# Patient Record
Sex: FEMALE | Race: ASIAN | NOT HISPANIC OR LATINO | URBAN - METROPOLITAN AREA
[De-identification: names, ages, dates, MRNs, and addresses within clinical notes are randomized per-mention and may not be internally consistent; named-entity substitution may affect disease eponyms.]

---

## 2018-05-11 ENCOUNTER — EMERGENCY (EMERGENCY)
Facility: HOSPITAL | Age: 33
LOS: 1 days | Discharge: HOME | End: 2018-05-11
Attending: EMERGENCY MEDICINE | Admitting: EMERGENCY MEDICINE
Payer: COMMERCIAL

## 2018-05-11 VITALS
TEMPERATURE: 98 F | OXYGEN SATURATION: 98 % | DIASTOLIC BLOOD PRESSURE: 72 MMHG | SYSTOLIC BLOOD PRESSURE: 126 MMHG | RESPIRATION RATE: 16 BRPM | HEART RATE: 86 BPM

## 2018-05-11 VITALS
WEIGHT: 175.05 LBS | RESPIRATION RATE: 16 BRPM | DIASTOLIC BLOOD PRESSURE: 94 MMHG | HEIGHT: 62 IN | SYSTOLIC BLOOD PRESSURE: 123 MMHG | HEART RATE: 92 BPM | OXYGEN SATURATION: 97 % | TEMPERATURE: 98 F

## 2018-05-11 PROCEDURE — 99283 EMERGENCY DEPT VISIT LOW MDM: CPT

## 2018-05-11 RX ORDER — CLONAZEPAM 1 MG
1 TABLET ORAL
Qty: 0 | Refills: 0 | COMMUNITY

## 2018-05-11 RX ORDER — SUCRALFATE 1 G
10 TABLET ORAL
Qty: 400 | Refills: 0 | OUTPATIENT
Start: 2018-05-11 | End: 2018-05-20

## 2018-05-11 RX ORDER — SUCRALFATE 1 G
1 TABLET ORAL ONCE
Qty: 0 | Refills: 0 | Status: COMPLETED | OUTPATIENT
Start: 2018-05-11 | End: 2018-05-11

## 2018-05-11 RX ADMIN — Medication 1 GRAM(S): at 07:40

## 2018-05-11 NOTE — ED PROVIDER NOTE - CARE PLAN
Principal Discharge DX:	Dysphagia, unspecified type  Assessment and plan of treatment:	Return to the ED for any new or worsening symptoms  Take your medication as prescribed   Carafate 1 gm 1 hour prior to meals and at bedtime   Call the gastroenterologist today to schedule follow up  Advance activity as tolerated  Secondary Diagnosis:	GERD with esophagitis

## 2018-05-11 NOTE — ED PROVIDER NOTE - OBJECTIVE STATEMENT
Pt is a 33 yo female who presents to the ED with a cc of throat discomfort.  PMHx of anxiety, agoraphobia.  Pt reports that starting 2-3 days ago she began to feel like her throat was producing increased salvia.  She feels that this is more then she normally produces.  Pt further reports a discomfort in her throat.  She reports that it feels like something is stuck but she denies noting anything becoming lodged in her throat.  Pt reports that she is tolerating liquids, solids, and her salvia but that she doesn't want to swallow it because her salvia tastes strange.  Denies fever, chills, N/V, CP, SOB, abd pain.  Pt does report that several days ago she had some epigastric discomfort but that this has since resolved.  Denies h/o GERD but also reports that she has not followed up with any GI physician.  She recently moved to [place]  LMP 4/20 denies chance of pregnancy

## 2018-05-11 NOTE — ED PROVIDER NOTE - PROGRESS NOTE DETAILS
Pt with improvement in symptoms. Will need to follow up with GI as outpatient for possible endoscope given symptoms.  Tolerating po at this time.  Will discharge home with outpatient management

## 2018-05-11 NOTE — ED PROVIDER NOTE - ENMT, MLM
Airway patent, Nasal mucosa clear. Mouth with normal mucosa. Throat has no vesicles, no oropharyngeal exudates and uvula is midline. Enlarged tonsils noted cryptic in nature no food stuck noted at this time, no enlarged salivary ducts noted

## 2018-05-11 NOTE — ED PROVIDER NOTE - PLAN OF CARE
Return to the ED for any new or worsening symptoms  Take your medication as prescribed   Carafate 1 gm 1 hour prior to meals and at bedtime   Call the gastroenterologist today to schedule follow up  Advance activity as tolerated

## 2019-11-26 NOTE — ED PROVIDER NOTE - NEUROLOGICAL, MLM
Ventricular Rate : 70  Atrial Rate : 70  P-R Interval : 164  QRS Duration : 80  Q-T Interval : 404  QTC Calculation(Bazett) : 436  P Axis : 78  R Axis : 18  T Axis : 71  Diagnosis : Sinus rhythm with occasional Premature ventricular complexes  Otherwise normal ECG  When compared with ECG of 27-NOV-2019 17:46,  Premature ventricular complexes are now Present  ST elevation now present in Anterior leads  Nonspecific T wave abnormality no longer evident in Anterior leads  Confirmed by BENNY GELLER, LINDA ([id number]) on 11/28/2019 2:25:51 PM   Alert and oriented, no focal deficits, no motor or sensory deficits.

## 2024-11-25 NOTE — ED ADULT TRIAGE NOTE - HEIGHT IN INCHES
Rx Refill Note  Requested Prescriptions     Pending Prescriptions Disp Refills    valsartan (DIOVAN) 160 MG tablet [Pharmacy Med Name: VALSARTAN 160 MG TABLET] 90 tablet 1     Sig: TAKE 1 TABLET BY MOUTH DAILY      Last office visit with prescribing clinician: 10/30/2024   Last telemedicine visit with prescribing clinician: Visit date not found   Next office visit with prescribing clinician: 12/11/2024                         Would you like a call back once the refill request has been completed: [] Yes [] No    If the office needs to give you a call back, can they leave a voicemail: [] Yes [] No    Kimberly Henriquez MA  11/25/24, 08:48 EST     2

## 2025-04-30 NOTE — ED PROVIDER NOTE - DISCHARGE REVIEW MATERIAL PRESENTED
[facility] MATERNAL FETAL MED  [address]  [facility] [place] [facility] [place] [address]  Dept: [phone]     2025    RE:  Aleksandra Vanegas     : 1979   AGE: 45 y.o.     Dear Dr. Kc,    Thank you for allowing me to see Aleksandra Vanegas.  As I'm sure you will recall, Aleksandra Vanegas is a 45 y.o. [id number]'s last menstrual period was 2024 (exact date). Estimated Date of Delivery: 8/15/25 at 24w5d seen in our office today for the following:    REASON FOR VISIT: Growth     Patient Active Problem List    Diagnosis Date Noted    24 weeks gestation of pregnancy 2025    AMA 2025    cHTN (no meds) 2025    Sixth pregnancy 2025    History of DVT of lower extremity 2025    History of gestational diabetes in prior pregnancy, currently pregnant 2025    H/O gastric bypass 2025    Hx of intestinal obstruction 2025    History of hysteroscopic myomectomy 2025    History of pulmonary embolism 2025    Hx  x1 2025    Intramural leiomyoma of uterus 2025    Uterine fibroid complicating  care, baby not yet delivered 2025    Questionable Antiphospholipid syndrome 2021    Endometrial hyperplasia without atypia 2021    Anemia 2021    Menorrhagia 2021    History of cerebrovascular accident (CVA) due to ischemia 2021        PAST HISTORY:  OB History    Para Term  AB Living   6 2 2  3 2   SAB IAB Ectopic Molar Multiple Live Births   3     2      # Outcome Date GA Lbr Vic/2nd Weight Sex Type Anes PTL Lv   6 Current            5 2022           4 2019           3 2017           2 Term 2009    M CS-LTranv EPI N JOSE LUIS      Birth Comments: malpresentation per pt   1 Term  38w0d   F Vag-Spont EPI N JOSE LUIS      Birth Comments: GDMA-2          MEDICAL:  Past Medical History:   Diagnosis Date    ADHD     Dr. Zhu  
Please refer to attached ultrasound report for doctor's evaluation of the clinical information obtained by vital signs, ultrasound, and/or non-stress test along with management recommendation.    
.